# Patient Record
Sex: FEMALE | Race: WHITE | Employment: OTHER | URBAN - METROPOLITAN AREA
[De-identification: names, ages, dates, MRNs, and addresses within clinical notes are randomized per-mention and may not be internally consistent; named-entity substitution may affect disease eponyms.]

---

## 2017-01-01 ENCOUNTER — HOSPITAL ENCOUNTER (INPATIENT)
Facility: HOSPITAL | Age: 82
LOS: 1 days | Discharge: HOME | DRG: 303 | End: 2017-01-01
Attending: EMERGENCY MEDICINE | Admitting: INTERNAL MEDICINE
Payer: MEDICARE

## 2017-01-01 ENCOUNTER — HOSPITAL ENCOUNTER (INPATIENT)
Facility: HOSPITAL | Age: 82
LOS: 1 days | DRG: 871 | End: 2017-01-01
Attending: EMERGENCY MEDICINE | Admitting: INTERNAL MEDICINE
Payer: MEDICARE

## 2017-01-01 ENCOUNTER — HOSPITAL ENCOUNTER (EMERGENCY)
Facility: HOSPITAL | Age: 82
Discharge: HOME | End: 2017-01-01
Attending: EMERGENCY MEDICINE
Payer: MEDICARE

## 2017-01-01 ENCOUNTER — HOSPITAL ENCOUNTER (OUTPATIENT)
Dept: RESPIRATORY THERAPY | Facility: HOSPITAL | Age: 82
Discharge: HOME | End: 2017-01-01
Attending: INTERNAL MEDICINE
Payer: MEDICARE

## 2017-01-01 ENCOUNTER — APPOINTMENT (OUTPATIENT)
Dept: CT IMAGING | Facility: HOSPITAL | Age: 82
DRG: 303 | End: 2017-01-01
Attending: INTERNAL MEDICINE
Payer: MEDICARE

## 2017-01-01 ENCOUNTER — APPOINTMENT (OUTPATIENT)
Dept: CV DIAGNOSTICS | Facility: HOSPITAL | Age: 82
DRG: 303 | End: 2017-01-01
Attending: INTERNAL MEDICINE
Payer: MEDICARE

## 2017-01-01 ENCOUNTER — APPOINTMENT (OUTPATIENT)
Dept: GENERAL RADIOLOGY | Facility: HOSPITAL | Age: 82
DRG: 303 | End: 2017-01-01
Attending: EMERGENCY MEDICINE
Payer: MEDICARE

## 2017-01-01 ENCOUNTER — APPOINTMENT (OUTPATIENT)
Dept: GENERAL RADIOLOGY | Facility: HOSPITAL | Age: 82
DRG: 871 | End: 2017-01-01
Attending: EMERGENCY MEDICINE
Payer: MEDICARE

## 2017-01-01 ENCOUNTER — APPOINTMENT (OUTPATIENT)
Dept: INTERVENTIONAL RADIOLOGY/VASCULAR | Facility: HOSPITAL | Age: 82
DRG: 871 | End: 2017-01-01
Attending: INTERNAL MEDICINE
Payer: MEDICARE

## 2017-01-01 VITALS
DIASTOLIC BLOOD PRESSURE: 44 MMHG | BODY MASS INDEX: 21 KG/M2 | HEART RATE: 136 BPM | WEIGHT: 108 LBS | OXYGEN SATURATION: 95 % | RESPIRATION RATE: 35 BRPM | TEMPERATURE: 99 F | SYSTOLIC BLOOD PRESSURE: 85 MMHG

## 2017-01-01 VITALS
HEIGHT: 60 IN | RESPIRATION RATE: 20 BRPM | HEART RATE: 93 BPM | BODY MASS INDEX: 21.14 KG/M2 | SYSTOLIC BLOOD PRESSURE: 115 MMHG | WEIGHT: 107.69 LBS | OXYGEN SATURATION: 93 % | DIASTOLIC BLOOD PRESSURE: 61 MMHG | TEMPERATURE: 98 F

## 2017-01-01 VITALS
SYSTOLIC BLOOD PRESSURE: 125 MMHG | OXYGEN SATURATION: 100 % | HEIGHT: 60 IN | DIASTOLIC BLOOD PRESSURE: 61 MMHG | HEART RATE: 85 BPM | BODY MASS INDEX: 21.29 KG/M2 | WEIGHT: 108.44 LBS | TEMPERATURE: 98 F | RESPIRATION RATE: 20 BRPM

## 2017-01-01 VITALS
WEIGHT: 110 LBS | RESPIRATION RATE: 19 BRPM | OXYGEN SATURATION: 100 % | TEMPERATURE: 98 F | BODY MASS INDEX: 21.6 KG/M2 | DIASTOLIC BLOOD PRESSURE: 48 MMHG | HEIGHT: 60 IN | SYSTOLIC BLOOD PRESSURE: 114 MMHG | HEART RATE: 78 BPM

## 2017-01-01 DIAGNOSIS — I21.4 NSTEMI (NON-ST ELEVATED MYOCARDIAL INFARCTION) (HCC): ICD-10-CM

## 2017-01-01 DIAGNOSIS — N30.01 ACUTE CYSTITIS WITH HEMATURIA: ICD-10-CM

## 2017-01-01 DIAGNOSIS — R65.21 SEPTIC SHOCK (HCC): Primary | ICD-10-CM

## 2017-01-01 DIAGNOSIS — N17.9 AKI (ACUTE KIDNEY INJURY) (HCC): ICD-10-CM

## 2017-01-01 DIAGNOSIS — J44.9 COPD (CHRONIC OBSTRUCTIVE PULMONARY DISEASE) (HCC): ICD-10-CM

## 2017-01-01 DIAGNOSIS — R77.8 ELEVATED TROPONIN: Primary | ICD-10-CM

## 2017-01-01 DIAGNOSIS — R53.1 WEAKNESS GENERALIZED: ICD-10-CM

## 2017-01-01 DIAGNOSIS — R07.9 ACUTE CHEST PAIN: Primary | ICD-10-CM

## 2017-01-01 DIAGNOSIS — A41.9 SEPTIC SHOCK (HCC): Primary | ICD-10-CM

## 2017-01-01 DIAGNOSIS — R77.8 ELEVATED TROPONIN: ICD-10-CM

## 2017-01-01 DIAGNOSIS — J44.9 CHRONIC OBSTRUCTIVE PULMONARY DISEASE, UNSPECIFIED COPD TYPE (HCC): ICD-10-CM

## 2017-01-01 LAB
ALBUMIN SERPL BCP-MCNC: 3.7 G/DL (ref 3.5–4.8)
ALP SERPL-CCNC: 73 U/L (ref 32–100)
ALT SERPL-CCNC: 22 U/L (ref 14–54)
ANION GAP SERPL CALC-SCNC: 14 MMOL/L (ref 0–18)
ANION GAP SERPL CALC-SCNC: 7 MMOL/L (ref 0–18)
ANION GAP SERPL CALC-SCNC: 8 MMOL/L (ref 0–18)
ANTIBODY SCREEN: NEGATIVE
APTT PPP: 139 SECONDS (ref 23.2–35.3)
APTT PPP: 30.1 SECONDS (ref 23.2–35.3)
APTT PPP: 36.5 SECONDS (ref 23.2–35.3)
APTT PPP: 48.5 SECONDS (ref 23.2–35.3)
AST SERPL-CCNC: 32 U/L (ref 15–41)
BASOPHILS # BLD: 0 K/UL (ref 0–0.2)
BASOPHILS # BLD: 0.2 K/UL (ref 0–0.2)
BASOPHILS NFR BLD: 1 %
BASOPHILS NFR BLD: 1 %
BILIRUB DIRECT SERPL-MCNC: 0.1 MG/DL (ref 0–0.2)
BILIRUB SERPL-MCNC: 0.7 MG/DL (ref 0.3–1.2)
BILIRUB UR QL: NEGATIVE
BILIRUB UR QL: NEGATIVE
BNP SERPL-MCNC: 1128 PG/ML (ref 0–100)
BNP SERPL-MCNC: 309 PG/ML (ref 0–100)
BUN SERPL-MCNC: 15 MG/DL (ref 8–20)
BUN SERPL-MCNC: 17 MG/DL (ref 8–20)
BUN SERPL-MCNC: 28 MG/DL (ref 8–20)
BUN/CREAT SERPL: 12.6 (ref 10–20)
BUN/CREAT SERPL: 15 (ref 10–20)
BUN/CREAT SERPL: 16.7 (ref 10–20)
CALCIUM SERPL-MCNC: 8.9 MG/DL (ref 8.5–10.5)
CALCIUM SERPL-MCNC: 9.2 MG/DL (ref 8.5–10.5)
CALCIUM SERPL-MCNC: 9.2 MG/DL (ref 8.5–10.5)
CHLORIDE SERPL-SCNC: 101 MMOL/L (ref 95–110)
CHLORIDE SERPL-SCNC: 101 MMOL/L (ref 95–110)
CHLORIDE SERPL-SCNC: 103 MMOL/L (ref 95–110)
CHOLEST SERPL-MCNC: 194 MG/DL (ref 110–200)
CHOLEST SERPL-MCNC: 197 MG/DL (ref 110–200)
CLARITY UR: CLEAR
CO2 SERPL-SCNC: 23 MMOL/L (ref 22–32)
CO2 SERPL-SCNC: 26 MMOL/L (ref 22–32)
CO2 SERPL-SCNC: 27 MMOL/L (ref 22–32)
COLOR UR: YELLOW
COLOR UR: YELLOW
CREAT SERPL-MCNC: 1.13 MG/DL (ref 0.5–1.5)
CREAT SERPL-MCNC: 1.19 MG/DL (ref 0.5–1.5)
CREAT SERPL-MCNC: 1.68 MG/DL (ref 0.5–1.5)
EOSINOPHIL # BLD: 0 K/UL (ref 0–0.7)
EOSINOPHIL # BLD: 0 K/UL (ref 0–0.7)
EOSINOPHIL NFR BLD: 0 %
EOSINOPHIL NFR BLD: 1 %
ERYTHROCYTE [DISTWIDTH] IN BLOOD BY AUTOMATED COUNT: 13.9 % (ref 11–15)
ERYTHROCYTE [DISTWIDTH] IN BLOOD BY AUTOMATED COUNT: 14.4 % (ref 11–15)
GLUCOSE BLDC GLUCOMTR-MCNC: 124 MG/DL (ref 70–99)
GLUCOSE SERPL-MCNC: 109 MG/DL (ref 70–99)
GLUCOSE SERPL-MCNC: 112 MG/DL (ref 70–99)
GLUCOSE SERPL-MCNC: 142 MG/DL (ref 70–99)
GLUCOSE UR-MCNC: NEGATIVE MG/DL
GLUCOSE UR-MCNC: NEGATIVE MG/DL
HCT VFR BLD AUTO: 34.4 % (ref 35–48)
HCT VFR BLD AUTO: 36.2 % (ref 35–48)
HDLC SERPL-MCNC: 100 MG/DL
HDLC SERPL-MCNC: 93 MG/DL
HGB BLD-MCNC: 11.5 G/DL (ref 12–16)
HGB BLD-MCNC: 12.4 G/DL (ref 12–16)
HGB UR QL STRIP.AUTO: NEGATIVE
INR BLD: 1 (ref 0.9–1.2)
KETONES UR-MCNC: NEGATIVE MG/DL
KETONES UR-MCNC: NEGATIVE MG/DL
LACTATE SERPL-SCNC: 1.3 MMOL/L (ref 0.5–2.2)
LDLC SERPL CALC-MCNC: 75 MG/DL (ref 0–99)
LDLC SERPL CALC-MCNC: 84 MG/DL (ref 0–99)
LEUKOCYTE ESTERASE UR QL STRIP.AUTO: NEGATIVE
LYMPHOCYTES # BLD: 0.4 K/UL (ref 1–4)
LYMPHOCYTES # BLD: 0.8 K/UL (ref 1–4)
LYMPHOCYTES NFR BLD: 10 %
LYMPHOCYTES NFR BLD: 2 %
MCH RBC QN AUTO: 31.2 PG (ref 27–32)
MCH RBC QN AUTO: 31.2 PG (ref 27–32)
MCHC RBC AUTO-ENTMCNC: 33.4 G/DL (ref 32–37)
MCHC RBC AUTO-ENTMCNC: 34.2 G/DL (ref 32–37)
MCV RBC AUTO: 91.2 FL (ref 80–100)
MCV RBC AUTO: 93.6 FL (ref 80–100)
MONOCYTES # BLD: 0.7 K/UL (ref 0–1)
MONOCYTES # BLD: 0.7 K/UL (ref 0–1)
MONOCYTES NFR BLD: 3 %
MONOCYTES NFR BLD: 8 %
MRSA DNA SPEC QL NAA+PROBE: NEGATIVE
NEUTROPHILS # BLD AUTO: 19 K/UL (ref 1.8–7.7)
NEUTROPHILS # BLD AUTO: 7 K/UL (ref 1.8–7.7)
NEUTROPHILS NFR BLD: 82 %
NEUTROPHILS NFR BLD: 94 %
NITRITE UR QL STRIP.AUTO: NEGATIVE
NITRITE UR QL STRIP.AUTO: NEGATIVE
NONHDLC SERPL-MCNC: 104 MG/DL
NONHDLC SERPL-MCNC: 94 MG/DL
OSMOLALITY UR CALC.SUM OF ELEC: 283 MOSM/KG (ref 275–295)
OSMOLALITY UR CALC.SUM OF ELEC: 286 MOSM/KG (ref 275–295)
OSMOLALITY UR CALC.SUM OF ELEC: 292 MOSM/KG (ref 275–295)
PH UR: 5 [PH] (ref 5–8)
PH UR: 5 [PH] (ref 5–8)
PLATELET # BLD AUTO: 291 K/UL (ref 140–400)
PLATELET # BLD AUTO: 362 K/UL (ref 140–400)
PMV BLD AUTO: 7.4 FL (ref 7.4–10.3)
PMV BLD AUTO: 7.6 FL (ref 7.4–10.3)
POTASSIUM SERPL-SCNC: 3.7 MMOL/L (ref 3.3–5.1)
POTASSIUM SERPL-SCNC: 3.8 MMOL/L (ref 3.3–5.1)
PROT SERPL-MCNC: 6.7 G/DL (ref 5.9–8.4)
PROT UR-MCNC: 30 MG/DL
PROT UR-MCNC: NEGATIVE MG/DL
PROTHROMBIN TIME: 13.2 SECONDS (ref 11.8–14.5)
RBC # BLD AUTO: 3.68 M/UL (ref 3.7–5.4)
RBC # BLD AUTO: 3.97 M/UL (ref 3.7–5.4)
RBC #/AREA URNS AUTO: 3 /HPF
RH BLOOD TYPE: POSITIVE
SODIUM SERPL-SCNC: 136 MMOL/L (ref 136–144)
SODIUM SERPL-SCNC: 136 MMOL/L (ref 136–144)
SODIUM SERPL-SCNC: 138 MMOL/L (ref 136–144)
SP GR UR STRIP: 1.01 (ref 1–1.03)
SP GR UR STRIP: 1.01 (ref 1–1.03)
T3 SERPL-MCNC: 0.38 NG/ML (ref 0.87–1.78)
T4 FREE SERPL-MCNC: 1.11 NG/DL (ref 0.58–1.64)
TRIGL SERPL-MCNC: 97 MG/DL (ref 1–149)
TRIGL SERPL-MCNC: 99 MG/DL (ref 1–149)
TROPONIN I SERPL-MCNC: 0.21 NG/ML (ref ?–0.03)
TROPONIN I SERPL-MCNC: 0.23 NG/ML (ref ?–0.03)
TROPONIN I SERPL-MCNC: 2.59 NG/ML (ref ?–0.03)
TROPONIN I SERPL-MCNC: 9.42 NG/ML (ref ?–0.03)
TSH SERPL-ACNC: 0.33 UIU/ML (ref 0.45–5.33)
UROBILINOGEN UR STRIP-ACNC: <2
UROBILINOGEN UR STRIP-ACNC: <2
VIT C UR-MCNC: NEGATIVE MG/DL
VIT C UR-MCNC: NEGATIVE MG/DL
WBC # BLD AUTO: 20.3 K/UL (ref 4–11)
WBC # BLD AUTO: 8.6 K/UL (ref 4–11)
WBC #/AREA URNS AUTO: 15 /HPF

## 2017-01-01 PROCEDURE — 83880 ASSAY OF NATRIURETIC PEPTIDE: CPT | Performed by: EMERGENCY MEDICINE

## 2017-01-01 PROCEDURE — 84443 ASSAY THYROID STIM HORMONE: CPT | Performed by: INTERNAL MEDICINE

## 2017-01-01 PROCEDURE — 87086 URINE CULTURE/COLONY COUNT: CPT | Performed by: EMERGENCY MEDICINE

## 2017-01-01 PROCEDURE — 51701 INSERT BLADDER CATHETER: CPT

## 2017-01-01 PROCEDURE — 80048 BASIC METABOLIC PNL TOTAL CA: CPT | Performed by: INTERNAL MEDICINE

## 2017-01-01 PROCEDURE — 87186 SC STD MICRODIL/AGAR DIL: CPT

## 2017-01-01 PROCEDURE — 36415 COLL VENOUS BLD VENIPUNCTURE: CPT

## 2017-01-01 PROCEDURE — 3E0F73Z INTRODUCTION OF ANTI-INFLAMMATORY INTO RESPIRATORY TRACT, VIA NATURAL OR ARTIFICIAL OPENING: ICD-10-PCS | Performed by: INTERNAL MEDICINE

## 2017-01-01 PROCEDURE — 87186 SC STD MICRODIL/AGAR DIL: CPT | Performed by: EMERGENCY MEDICINE

## 2017-01-01 PROCEDURE — 82962 GLUCOSE BLOOD TEST: CPT

## 2017-01-01 PROCEDURE — 84439 ASSAY OF FREE THYROXINE: CPT | Performed by: INTERNAL MEDICINE

## 2017-01-01 PROCEDURE — 86901 BLOOD TYPING SEROLOGIC RH(D): CPT | Performed by: EMERGENCY MEDICINE

## 2017-01-01 PROCEDURE — 93005 ELECTROCARDIOGRAM TRACING: CPT

## 2017-01-01 PROCEDURE — 99285 EMERGENCY DEPT VISIT HI MDM: CPT

## 2017-01-01 PROCEDURE — 85025 COMPLETE CBC W/AUTO DIFF WBC: CPT | Performed by: EMERGENCY MEDICINE

## 2017-01-01 PROCEDURE — 80048 BASIC METABOLIC PNL TOTAL CA: CPT | Performed by: EMERGENCY MEDICINE

## 2017-01-01 PROCEDURE — 93010 ELECTROCARDIOGRAM REPORT: CPT | Performed by: EMERGENCY MEDICINE

## 2017-01-01 PROCEDURE — 84132 ASSAY OF SERUM POTASSIUM: CPT | Performed by: INTERNAL MEDICINE

## 2017-01-01 PROCEDURE — 84484 ASSAY OF TROPONIN QUANT: CPT | Performed by: INTERNAL MEDICINE

## 2017-01-01 PROCEDURE — 80053 COMPREHEN METABOLIC PANEL: CPT | Performed by: INTERNAL MEDICINE

## 2017-01-01 PROCEDURE — 96365 THER/PROPH/DIAG IV INF INIT: CPT

## 2017-01-01 PROCEDURE — 84484 ASSAY OF TROPONIN QUANT: CPT | Performed by: EMERGENCY MEDICINE

## 2017-01-01 PROCEDURE — 36556 INSERT NON-TUNNEL CV CATH: CPT

## 2017-01-01 PROCEDURE — 85610 PROTHROMBIN TIME: CPT | Performed by: EMERGENCY MEDICINE

## 2017-01-01 PROCEDURE — 86900 BLOOD TYPING SEROLOGIC ABO: CPT | Performed by: EMERGENCY MEDICINE

## 2017-01-01 PROCEDURE — 71010 XR CHEST AP PORTABLE  (CPT=71010): CPT | Performed by: EMERGENCY MEDICINE

## 2017-01-01 PROCEDURE — 81003 URINALYSIS AUTO W/O SCOPE: CPT | Performed by: EMERGENCY MEDICINE

## 2017-01-01 PROCEDURE — 86850 RBC ANTIBODY SCREEN: CPT | Performed by: EMERGENCY MEDICINE

## 2017-01-01 PROCEDURE — 85730 THROMBOPLASTIN TIME PARTIAL: CPT | Performed by: INTERNAL MEDICINE

## 2017-01-01 PROCEDURE — 99284 EMERGENCY DEPT VISIT MOD MDM: CPT

## 2017-01-01 PROCEDURE — 80061 LIPID PANEL: CPT | Performed by: EMERGENCY MEDICINE

## 2017-01-01 PROCEDURE — 83605 ASSAY OF LACTIC ACID: CPT | Performed by: EMERGENCY MEDICINE

## 2017-01-01 PROCEDURE — 81001 URINALYSIS AUTO W/SCOPE: CPT | Performed by: EMERGENCY MEDICINE

## 2017-01-01 PROCEDURE — 85025 COMPLETE CBC W/AUTO DIFF WBC: CPT | Performed by: INTERNAL MEDICINE

## 2017-01-01 PROCEDURE — 93306 TTE W/DOPPLER COMPLETE: CPT | Performed by: INTERNAL MEDICINE

## 2017-01-01 PROCEDURE — 96375 TX/PRO/DX INJ NEW DRUG ADDON: CPT

## 2017-01-01 PROCEDURE — 94640 AIRWAY INHALATION TREATMENT: CPT

## 2017-01-01 PROCEDURE — 93010 ELECTROCARDIOGRAM REPORT: CPT | Performed by: INTERNAL MEDICINE

## 2017-01-01 PROCEDURE — 85730 THROMBOPLASTIN TIME PARTIAL: CPT | Performed by: EMERGENCY MEDICINE

## 2017-01-01 PROCEDURE — 51702 INSERT TEMP BLADDER CATH: CPT

## 2017-01-01 PROCEDURE — 36415 COLL VENOUS BLD VENIPUNCTURE: CPT | Performed by: INTERNAL MEDICINE

## 2017-01-01 PROCEDURE — 80061 LIPID PANEL: CPT | Performed by: INTERNAL MEDICINE

## 2017-01-01 PROCEDURE — 84480 ASSAY TRIIODOTHYRONINE (T3): CPT | Performed by: INTERNAL MEDICINE

## 2017-01-01 PROCEDURE — 71250 CT THORAX DX C-: CPT | Performed by: INTERNAL MEDICINE

## 2017-01-01 PROCEDURE — 87641 MR-STAPH DNA AMP PROBE: CPT | Performed by: EMERGENCY MEDICINE

## 2017-01-01 PROCEDURE — 87077 CULTURE AEROBIC IDENTIFY: CPT

## 2017-01-01 PROCEDURE — 87040 BLOOD CULTURE FOR BACTERIA: CPT

## 2017-01-01 PROCEDURE — 87077 CULTURE AEROBIC IDENTIFY: CPT | Performed by: EMERGENCY MEDICINE

## 2017-01-01 PROCEDURE — 80076 HEPATIC FUNCTION PANEL: CPT | Performed by: EMERGENCY MEDICINE

## 2017-01-01 PROCEDURE — 05HM33Z INSERTION OF INFUSION DEVICE INTO RIGHT INTERNAL JUGULAR VEIN, PERCUTANEOUS APPROACH: ICD-10-PCS | Performed by: INTERNAL MEDICINE

## 2017-01-01 PROCEDURE — 83735 ASSAY OF MAGNESIUM: CPT | Performed by: EMERGENCY MEDICINE

## 2017-01-01 RX ORDER — PREDNISONE 20 MG/1
20 TABLET ORAL
Status: DISCONTINUED | OUTPATIENT
Start: 2017-01-01 | End: 2017-01-01

## 2017-01-01 RX ORDER — ISOSORBIDE MONONITRATE 30 MG/1
30 TABLET, EXTENDED RELEASE ORAL 2 TIMES DAILY
Qty: 60 TABLET | Refills: 0 | Status: SHIPPED | OUTPATIENT
Start: 2017-01-01

## 2017-01-01 RX ORDER — ALBUTEROL SULFATE 2.5 MG/3ML
SOLUTION RESPIRATORY (INHALATION) EVERY 4 HOURS PRN
COMMUNITY

## 2017-01-01 RX ORDER — VERAPAMIL HYDROCHLORIDE 40 MG/1
120 TABLET ORAL DAILY
Status: DISCONTINUED | OUTPATIENT
Start: 2017-01-01 | End: 2017-01-01

## 2017-01-01 RX ORDER — ONDANSETRON 2 MG/ML
4 INJECTION INTRAMUSCULAR; INTRAVENOUS EVERY 6 HOURS PRN
Status: DISCONTINUED | OUTPATIENT
Start: 2017-01-01 | End: 2017-01-01

## 2017-01-01 RX ORDER — LIDOCAINE HYDROCHLORIDE 20 MG/ML
INJECTION, SOLUTION EPIDURAL; INFILTRATION; INTRACAUDAL; PERINEURAL
Status: COMPLETED
Start: 2017-01-01 | End: 2017-01-01

## 2017-01-01 RX ORDER — CLOPIDOGREL BISULFATE 75 MG/1
300 TABLET ORAL ONCE
Status: DISCONTINUED | OUTPATIENT
Start: 2017-01-01 | End: 2017-01-01

## 2017-01-01 RX ORDER — SENNOSIDES 8.6 MG
8.6 TABLET ORAL 3 TIMES DAILY PRN
COMMUNITY

## 2017-01-01 RX ORDER — ASPIRIN 81 MG/1
81 TABLET, CHEWABLE ORAL DAILY
Status: DISCONTINUED | OUTPATIENT
Start: 2017-01-01 | End: 2017-07-30

## 2017-01-01 RX ORDER — ISOSORBIDE MONONITRATE 30 MG/1
30 TABLET, EXTENDED RELEASE ORAL 2 TIMES DAILY
Status: DISCONTINUED | OUTPATIENT
Start: 2017-01-01 | End: 2017-01-01

## 2017-01-01 RX ORDER — SODIUM CHLORIDE 0.9 % (FLUSH) 0.9 %
3 SYRINGE (ML) INJECTION AS NEEDED
Status: DISCONTINUED | OUTPATIENT
Start: 2017-01-01 | End: 2017-01-01

## 2017-01-01 RX ORDER — DIPHENHYDRAMINE HCL 25 MG
25 CAPSULE ORAL EVERY 6 HOURS PRN
Status: DISCONTINUED | OUTPATIENT
Start: 2017-01-01 | End: 2017-01-01

## 2017-01-01 RX ORDER — DOXEPIN HYDROCHLORIDE 50 MG/1
1 CAPSULE ORAL DAILY
Status: DISCONTINUED | OUTPATIENT
Start: 2017-01-01 | End: 2017-01-01

## 2017-01-01 RX ORDER — ACETAMINOPHEN 650 MG/1
650 SUPPOSITORY RECTAL ONCE
Status: COMPLETED | OUTPATIENT
Start: 2017-01-01 | End: 2017-01-01

## 2017-01-01 RX ORDER — POTASSIUM CHLORIDE 20 MEQ/1
40 TABLET, EXTENDED RELEASE ORAL ONCE
Status: COMPLETED | OUTPATIENT
Start: 2017-01-01 | End: 2017-01-01

## 2017-01-01 RX ORDER — PREDNISONE 1 MG/1
5 TABLET ORAL DAILY
Status: DISCONTINUED | OUTPATIENT
Start: 2017-01-01 | End: 2017-01-01

## 2017-01-01 RX ORDER — ASPIRIN 81 MG/1
81 TABLET ORAL DAILY
Status: DISCONTINUED | OUTPATIENT
Start: 2017-01-01 | End: 2017-01-01

## 2017-01-01 RX ORDER — ACETAMINOPHEN 325 MG/1
650 TABLET ORAL EVERY 6 HOURS PRN
Status: DISCONTINUED | OUTPATIENT
Start: 2017-01-01 | End: 2017-07-30

## 2017-01-01 RX ORDER — ACETAMINOPHEN 160 MG
2000 TABLET,DISINTEGRATING ORAL DAILY
COMMUNITY

## 2017-01-01 RX ORDER — FAMOTIDINE 20 MG/1
20 TABLET ORAL 2 TIMES DAILY
Status: DISCONTINUED | OUTPATIENT
Start: 2017-01-01 | End: 2017-01-01

## 2017-01-01 RX ORDER — HEPARIN SODIUM AND DEXTROSE 10000; 5 [USP'U]/100ML; G/100ML
INJECTION INTRAVENOUS CONTINUOUS
Status: DISCONTINUED | OUTPATIENT
Start: 2017-01-01 | End: 2017-01-01

## 2017-01-01 RX ORDER — CLOPIDOGREL BISULFATE 75 MG/1
75 TABLET ORAL DAILY
Status: DISCONTINUED | OUTPATIENT
Start: 2017-01-01 | End: 2017-07-30

## 2017-01-01 RX ORDER — FUROSEMIDE 20 MG/1
20 TABLET ORAL DAILY
Status: DISCONTINUED | OUTPATIENT
Start: 2017-01-01 | End: 2017-01-01

## 2017-01-01 RX ORDER — ASPIRIN 81 MG/1
81 TABLET, CHEWABLE ORAL DAILY
Status: DISCONTINUED | OUTPATIENT
Start: 2017-01-01 | End: 2017-01-01

## 2017-01-01 RX ORDER — ASPIRIN 325 MG
325 TABLET ORAL ONCE
Status: COMPLETED | OUTPATIENT
Start: 2017-01-01 | End: 2017-01-01

## 2017-01-01 RX ORDER — PRAVASTATIN SODIUM 40 MG
40 TABLET ORAL NIGHTLY
COMMUNITY

## 2017-01-01 RX ORDER — ALPRAZOLAM 0.25 MG/1
0.25 TABLET ORAL NIGHTLY PRN
Status: DISCONTINUED | OUTPATIENT
Start: 2017-01-01 | End: 2017-01-01

## 2017-01-01 RX ORDER — ACETAMINOPHEN AND CODEINE PHOSPHATE 300; 30 MG/1; MG/1
1 TABLET ORAL EVERY 6 HOURS PRN
Status: DISCONTINUED | OUTPATIENT
Start: 2017-01-01 | End: 2017-01-01

## 2017-01-01 RX ORDER — ENOXAPARIN SODIUM 100 MG/ML
40 INJECTION SUBCUTANEOUS DAILY
Status: DISCONTINUED | OUTPATIENT
Start: 2017-01-01 | End: 2017-01-01

## 2017-01-01 RX ORDER — ACYCLOVIR 400 MG/1
800 TABLET ORAL EVERY 8 HOURS
Status: DISCONTINUED | OUTPATIENT
Start: 2017-01-01 | End: 2017-01-01

## 2017-01-01 RX ORDER — LISINOPRIL 5 MG/1
5 TABLET ORAL DAILY
COMMUNITY

## 2017-01-01 RX ORDER — LORATADINE 10 MG/1
10 TABLET ORAL DAILY
COMMUNITY

## 2017-01-01 RX ORDER — MIDAZOLAM HYDROCHLORIDE 1 MG/ML
INJECTION INTRAMUSCULAR; INTRAVENOUS
Status: COMPLETED
Start: 2017-01-01 | End: 2017-01-01

## 2017-01-01 RX ORDER — PREDNISONE 1 MG/1
TABLET ORAL
Qty: 100 TABLET | Refills: 5 | Status: SHIPPED | OUTPATIENT
Start: 2017-01-01

## 2017-01-01 RX ORDER — FAMOTIDINE 20 MG/1
20 TABLET ORAL 2 TIMES DAILY
Status: DISCONTINUED | OUTPATIENT
Start: 2017-01-01 | End: 2017-07-30

## 2017-01-01 RX ORDER — SODIUM CHLORIDE 9 MG/ML
INJECTION, SOLUTION INTRAVENOUS CONTINUOUS
Status: DISCONTINUED | OUTPATIENT
Start: 2017-01-01 | End: 2017-07-30

## 2017-01-01 RX ORDER — FLUTICASONE PROPIONATE 50 MCG
1 SPRAY, SUSPENSION (ML) NASAL DAILY
COMMUNITY

## 2017-01-01 RX ORDER — ALBUTEROL SULFATE 2.5 MG/3ML
2.5 SOLUTION RESPIRATORY (INHALATION) EVERY 4 HOURS PRN
Status: DISCONTINUED | OUTPATIENT
Start: 2017-01-01 | End: 2017-07-30

## 2017-01-01 RX ORDER — 0.9 % SODIUM CHLORIDE 0.9 %
VIAL (ML) INJECTION
Status: COMPLETED
Start: 2017-01-01 | End: 2017-01-01

## 2017-01-01 RX ORDER — ASPIRIN 81 MG/1
324 TABLET, CHEWABLE ORAL ONCE
Status: COMPLETED | OUTPATIENT
Start: 2017-01-01 | End: 2017-01-01

## 2017-01-01 RX ORDER — ASPIRIN 300 MG
SUPPOSITORY, RECTAL RECTAL
Status: COMPLETED
Start: 2017-01-01 | End: 2017-01-01

## 2017-01-01 RX ORDER — SENNOSIDES 8.6 MG
8.6 TABLET ORAL 3 TIMES DAILY PRN
Status: DISCONTINUED | OUTPATIENT
Start: 2017-01-01 | End: 2017-01-01

## 2017-01-01 RX ORDER — NITROGLYCERIN 0.4 MG/1
0.4 TABLET SUBLINGUAL EVERY 5 MIN PRN
Qty: 30 TABLET | Refills: 0 | Status: SHIPPED | OUTPATIENT
Start: 2017-01-01

## 2017-01-01 RX ORDER — LORAZEPAM 0.5 MG/1
0.5 TABLET ORAL DAILY PRN
Status: DISCONTINUED | OUTPATIENT
Start: 2017-01-01 | End: 2017-01-01

## 2017-01-01 RX ORDER — OYSTER SHELL CALCIUM WITH VITAMIN D 500; 200 MG/1; [IU]/1
1 TABLET, FILM COATED ORAL DAILY
Status: DISCONTINUED | OUTPATIENT
Start: 2017-01-01 | End: 2017-01-01

## 2017-01-01 RX ORDER — ALPRAZOLAM 0.25 MG/1
0.25 TABLET ORAL 2 TIMES DAILY PRN
Status: DISCONTINUED | OUTPATIENT
Start: 2017-01-01 | End: 2017-07-30

## 2017-01-01 RX ORDER — MELATONIN
325
Status: DISCONTINUED | OUTPATIENT
Start: 2017-01-01 | End: 2017-01-01

## 2017-01-01 RX ORDER — POTASSIUM CHLORIDE 14.9 MG/ML
20 INJECTION INTRAVENOUS ONCE
Status: COMPLETED | OUTPATIENT
Start: 2017-01-01 | End: 2017-01-01

## 2017-01-01 RX ORDER — POLYETHYLENE GLYCOL 3350 17 G/17G
17 POWDER, FOR SOLUTION ORAL DAILY
Status: DISCONTINUED | OUTPATIENT
Start: 2017-01-01 | End: 2017-01-01

## 2017-01-01 RX ORDER — POTASSIUM CHLORIDE 20 MEQ/1
40 TABLET, EXTENDED RELEASE ORAL EVERY 4 HOURS
Status: DISCONTINUED | OUTPATIENT
Start: 2017-01-01 | End: 2017-01-01

## 2017-01-01 RX ORDER — ENOXAPARIN SODIUM 100 MG/ML
30 INJECTION SUBCUTANEOUS DAILY
Status: DISCONTINUED | OUTPATIENT
Start: 2017-01-01 | End: 2017-07-30

## 2017-01-01 RX ORDER — METOPROLOL TARTRATE 5 MG/5ML
2.5 INJECTION INTRAVENOUS ONCE
Status: COMPLETED | OUTPATIENT
Start: 2017-01-01 | End: 2017-01-01

## 2017-01-01 RX ORDER — LORAZEPAM 0.5 MG/1
0.5 TABLET ORAL DAILY PRN
COMMUNITY

## 2017-01-01 RX ORDER — METOPROLOL TARTRATE 5 MG/5ML
INJECTION INTRAVENOUS
Status: COMPLETED
Start: 2017-01-01 | End: 2017-01-01

## 2017-01-01 RX ORDER — CETIRIZINE HYDROCHLORIDE 10 MG/1
10 TABLET ORAL DAILY
Status: DISCONTINUED | OUTPATIENT
Start: 2017-01-01 | End: 2017-01-01

## 2017-01-01 RX ORDER — POTASSIUM CHLORIDE 750 MG/1
20 TABLET, EXTENDED RELEASE ORAL DAILY
Status: DISCONTINUED | OUTPATIENT
Start: 2017-01-01 | End: 2017-01-01

## 2017-01-01 RX ORDER — ONDANSETRON 2 MG/ML
4 INJECTION INTRAMUSCULAR; INTRAVENOUS EVERY 6 HOURS PRN
Status: DISCONTINUED | OUTPATIENT
Start: 2017-01-01 | End: 2017-07-30

## 2017-01-01 RX ORDER — SODIUM CHLORIDE 9 MG/ML
INJECTION, SOLUTION INTRAVENOUS
Status: COMPLETED
Start: 2017-01-01 | End: 2017-01-01

## 2017-01-01 RX ORDER — HEPARIN SODIUM AND DEXTROSE 10000; 5 [USP'U]/100ML; G/100ML
12 INJECTION INTRAVENOUS ONCE
Status: COMPLETED | OUTPATIENT
Start: 2017-01-01 | End: 2017-01-01

## 2017-01-01 RX ORDER — MELATONIN
325
Status: DISCONTINUED | OUTPATIENT
Start: 2017-07-30 | End: 2017-07-30

## 2017-01-01 RX ORDER — ONDANSETRON 2 MG/ML
INJECTION INTRAMUSCULAR; INTRAVENOUS
Status: COMPLETED
Start: 2017-01-01 | End: 2017-01-01

## 2017-01-01 RX ORDER — SODIUM CHLORIDE 0.9 % (FLUSH) 0.9 %
3 SYRINGE (ML) INJECTION AS NEEDED
Status: DISCONTINUED | OUTPATIENT
Start: 2017-01-01 | End: 2017-07-30

## 2017-01-01 RX ORDER — CLOPIDOGREL BISULFATE 75 MG/1
75 TABLET ORAL DAILY
Status: DISCONTINUED | OUTPATIENT
Start: 2017-01-01 | End: 2017-01-01

## 2017-01-01 RX ORDER — ACETAMINOPHEN 650 MG/1
SUPPOSITORY RECTAL
Status: COMPLETED
Start: 2017-01-01 | End: 2017-01-01

## 2017-01-01 RX ORDER — FUROSEMIDE 10 MG/ML
20 INJECTION INTRAMUSCULAR; INTRAVENOUS ONCE
Status: DISCONTINUED | OUTPATIENT
Start: 2017-01-01 | End: 2017-07-30

## 2017-01-01 RX ORDER — HEPARIN SODIUM 1000 [USP'U]/ML
60 INJECTION, SOLUTION INTRAVENOUS; SUBCUTANEOUS ONCE
Status: COMPLETED | OUTPATIENT
Start: 2017-01-01 | End: 2017-01-01

## 2017-05-05 PROBLEM — I10 ESSENTIAL HYPERTENSION: Status: ACTIVE | Noted: 2017-01-01

## 2017-06-16 PROBLEM — I42.9 CARDIOMYOPATHY (HCC): Status: ACTIVE | Noted: 2017-01-01

## 2017-06-16 PROBLEM — I50.20 SYSTOLIC DYSFUNCTION WITH HEART FAILURE (HCC): Status: ACTIVE | Noted: 2017-01-01

## 2017-06-16 PROBLEM — R77.8 ELEVATED TROPONIN: Status: ACTIVE | Noted: 2017-01-01

## 2017-06-16 PROBLEM — J44.9 COPD (CHRONIC OBSTRUCTIVE PULMONARY DISEASE) (HCC): Status: ACTIVE | Noted: 2017-01-01

## 2017-06-16 PROBLEM — R91.1 INCIDENTAL LUNG NODULE, GREATER THAN OR EQUAL TO 8MM: Status: ACTIVE | Noted: 2017-01-01

## 2017-06-16 PROBLEM — R07.9 ACUTE CHEST PAIN: Status: ACTIVE | Noted: 2017-01-01

## 2017-06-16 NOTE — ED NOTES
1st attempt to IV insertion to Right FA unsuccessful. Moderate bruise to Right Forearm noted after attempt. Pt on plavix and pressure dressing applied. Ice applied.

## 2017-06-16 NOTE — ED PROVIDER NOTES
Patient Seen in: [facility] AND [facility] Emergency Department    History   Patient presents with:  Chest Pain Angina (cardiovascular)    Stated Complaint: chest pain that lasted 15 mins     HPI    79 yo F with PMH HTN, CAD c/b ICM (45% EF), aortic stenosis, HL p times daily. predniSONE 5 MG Oral Tab,  Take 1-2 pills as directed daily   famoTIDine (PEPCID) 20 MG Oral Tab,  Take 20 mg by mouth 2 (two) times daily.    ferrous sulfate 325 (65 FE) MG Oral Tab EC,  Take 325 mg by mouth daily with breakfast.   Potassium Exam     ED Triage Vitals   BP 06/16/17 1251 118/47 mmHg   Pulse 06/16/17 1251 79   Resp 06/16/17 1251 18   Temp 06/16/17 1251 98.4 °F (36.9 °C)   Temp src 06/16/17 1251 Oral   SpO2 06/16/17 1251 97 %   O2 Device 06/16/17 1251 Nasal cannula       Current:B Abnormality         Status                     ---------                               -----------         ------                     CBC W/ DIFFERENTIAL[[id number]]          Abnormal            Final result                 Please view results for these tamara 6/16/2017  CONCLUSION:  1. Mild cardiomegaly. Tortuous atherosclerotic aorta. 2. Emphysematous changes. Right upper lobe 1.9 x 1.0 cm spiculated lesion has developed. Differential diagnosis would include inflammatory versus neoplastic lesion. . 3. Recom

## 2017-06-16 NOTE — ED NOTES
Pt to ER with c/o chest pain this am that lasted about 15 minutes. Pt states she took full dose of ASA this am with the pain. Pt wears home o2 2-3L nasal cannula at home. Cardiac monitor and continuous pulse oximetry on. SR on monitor.  Lungs diminished penny

## 2017-06-16 NOTE — PLAN OF CARE
Problem: CARDIOVASCULAR - ADULT  Goal: Maintains optimal cardiac output and hemodynamic stability  INTERVENTIONS:  - Monitor vital signs, rhythm, and trends  - Monitor for bleeding, hypotension and signs of decreased cardiac output  - Evaluate effectivenes hypoglycemia  - Administer ordered medications to maintain glucose within target range  - Assess barriers to adequate nutritional intake and initiate nutrition consult as needed  - Instruct patient on self management of diabetes   Outcome: Augie Daley

## 2017-06-16 NOTE — ED INITIAL ASSESSMENT (HPI)
Pt reports feeling left sided chest pain that radiated into the arm and neck and jaw. Pt reports that it lasted about 15 mins. Pt took a full dose of ASA and felt immediate relief.  Pt denies pain at this time

## 2017-06-16 NOTE — ED NOTES
Nohemy RN aware patient will need a CT chest done upstairs. RN aware we do not have IMDUR dosage in the ER- pt still needs medication. RN verbalized understanding.

## 2017-06-16 NOTE — H&P
[address] Patient Status:  Emergency    1934 MRN [id number]   Location [address] Attending Christiano Sales MD   Hosp Day # 0 PCP Lili Long MD     Date: frontal/occ lobe CVA     History reviewed. No pertinent past surgical history.   Family History   Problem Relation Age of Onset   • Heart Disorder Mother    • Cancer Paternal Grandmother      breast cancer   • Heart Disorder Sister    • Diabetes Sister    • LUNGS: CTAB, no wrr   CV: +systolic ejection murmur   ABD: Soft, nontender and not distended, no masses, no hernia   EXTREMITIES: +BLE pitting edema   NEURO: Strength intact; no sensory deficits       Cervical Papanicolaou contraindicated    Results: Electronically signed on 06/16/2017 at 15:21 by Glenna Hair MD      Nuclear stress test 2/2015  IMPRESSION:  Abnormal myocardial perfusion study demonstrating a fixed small to   medium size perfusion defect in the inferior wall, consistent with   infar further work up  - ordered CT chest. Does not currently have outpatient pulmonologist      Myah Pierre MD  6/16/2017

## 2017-06-16 NOTE — CONSULTS
[facility] Cardiology  Report of Consultation    Koltonjay jay Kumar Patient Status:  Emergency    1934 MRN [id number]   Location [address] Attending Luma Orta MD   Hosp Day # 0 PCP Oswaldo Greene MD     Date of Admissio Paroxysmal SVT (supraventricular tachycardia) (HCC)    • Osteoporosis    • Hypovitaminosis D    • Anxiety    • Aortic valve stenosis      severe AS per 12/14 echo   • Cardiomyopathy ([address]      LVEF 45% based on 79/69 echo   • Embolic stroke involving middl normal S1, S2 without S3; no significant murmur. CAROTIDS:carotid pulses normal.   ABD AORTA:not enlarged. FEM:femoral pulses intact. PEDAL:pedal pulses intact. EXT:no peripheral edema.       LABORATORY DATA:     Labs reviewed:      Tele:      EKG:

## 2017-06-17 NOTE — PLAN OF CARE
CARDIOVASCULAR - ADULT    • Maintains optimal cardiac output and hemodynamic stability Adequate for Discharge    • Absence of cardiac arrhythmias or at baseline Adequate for Discharge        METABOLIC/FLUID AND ELECTROLYTES - ADULT    • Glucose maintained

## 2017-06-17 NOTE — DISCHARGE SUMMARY
[facility]    Discharge Summary    Imagene Rout Patient Status:  Inpatient    1934 MRN [id number]   Location [facility] 3W/SW Attending Crispin Turcios MD   Hosp Day # 1 PCP Allie Jones MD     Date of Admission: 30 mg daily this was increased to 60 mg daily.   Patient generally back to baseline during the hospitalization she is adamantly requesting to be discharged today she has refused any further cardiac testing or procedures and wishes to return to the care of h Inhalation Aerosol Powder, Breath Activated  Inhale 1 puff into the lungs 2 (two) times daily. Tiotropium Bromide Monohydrate (SPIRIVA HANDIHALER) 18 MCG Inhalation Cap  Inhale into the lungs daily.               Discharge Diet: Cardiac diet no added sal

## 2017-06-17 NOTE — PROGRESS NOTES
[address] [place] [address] Cardiology Progress Note        Maria De Jesus Frazier Patient Status:  Inpatient    1934 MRN [id number]   Location [facility] 3W/SW Attending Armando Reid MD   Hosp Day # 1 PCP MD [place] Worthington Lab  06/16/17   1256  06/17/17   0559   WBC  9.5  6.9   HGB  11.7*  11.4*   PLT  287  283       Chem:  Recent Labs   Lab  06/16/17   1256  06/17/17   0559   NA  139  139   K  4.2  3.5   CL  101  102   CO2  26  26   BUN  29*  23*   CREATSERUM  1.13  1.18

## 2017-06-18 NOTE — ED INITIAL ASSESSMENT (HPI)
Patient received via EMS with c/o episode of bilateral neck burning sensation last night. Discharged from this hospital yesterday s/p MI. Denies any current symptoms.

## 2017-06-18 NOTE — ED NOTES
Patient received discharge instructions with follow-up instructions and verbalized understanding. Patient ambulated out of ER in stable condition in no apparent distress.

## 2017-06-18 NOTE — ED PROVIDER NOTES
Patient Seen in: [facility] AND [facility] Emergency Department    History   Patient presents with:  Chest Pain Angina (cardiovascular)      HPI    The patient presents to the ED after experiencing burning in her upper chest and bilateral face last night and aga Marital Status:              Spouse Name:                       Years of Education:                 Number of children:               Social History Main Topics    Smoking Status: Former Smoker                   Packs/Day: 1.00  Years: 61        Typ breath sounds normal. No respiratory distress. Abdominal: Soft. She exhibits no distension. There is no tenderness. Musculoskeletal: She exhibits no tenderness. Neurological: She is alert and oriented to person, place, and time.    Skin: Skin is warm

## 2017-06-22 PROBLEM — I50.22 CHRONIC SYSTOLIC HEART FAILURE (HCC): Status: ACTIVE | Noted: 2017-01-01

## 2017-06-22 PROBLEM — R07.9 ACUTE CHEST PAIN: Status: RESOLVED | Noted: 2017-01-01 | Resolved: 2017-01-01

## 2017-06-27 NOTE — PROCEDURES
Pulmonary Function Test Results     Impression: Mild obstructive ventilatory defect (FEV1 1.18L, 75% of predicted). There is evidence of significant air trapping and hyperinflation (% of predicted, % of predicted).  The DLCO is technically garg

## 2017-07-05 PROBLEM — J96.11 CHRONIC RESPIRATORY FAILURE WITH HYPOXIA (HCC): Status: ACTIVE | Noted: 2017-01-01

## 2017-07-19 PROBLEM — R53.1 WEAKNESS GENERALIZED: Status: ACTIVE | Noted: 2017-01-01

## 2017-07-20 PROBLEM — R53.1 WEAKNESS GENERALIZED: Status: RESOLVED | Noted: 2017-01-01 | Resolved: 2017-01-01

## 2017-07-20 PROBLEM — R77.8 ELEVATED TROPONIN: Status: RESOLVED | Noted: 2017-01-01 | Resolved: 2017-01-01

## 2017-07-20 PROBLEM — J44.9 COPD (CHRONIC OBSTRUCTIVE PULMONARY DISEASE) (HCC): Status: RESOLVED | Noted: 2017-01-01 | Resolved: 2017-01-01

## 2017-07-20 PROBLEM — B02.9 HERPES ZOSTER: Status: ACTIVE | Noted: 2017-01-01

## 2017-07-20 NOTE — PLAN OF CARE
CARDIOVASCULAR - ADULT    • Maintains optimal cardiac output and hemodynamic stability Completed    • Absence of cardiac arrhythmias or at baseline Completed        METABOLIC/FLUID AND ELECTROLYTES - ADULT    • Electrolytes maintained within normal limits

## 2017-07-20 NOTE — PLAN OF CARE
CARDIOVASCULAR - ADULT    • Maintains optimal cardiac output and hemodynamic stability Progressing    • Absence of cardiac arrhythmias or at baseline Progressing        METABOLIC/FLUID AND ELECTROLYTES - ADULT    • Electrolytes maintained within normal nieves

## 2017-07-20 NOTE — ED PROVIDER NOTES
Patient Seen in: [facility] AND [facility] Emergency Department    History   Patient presents with:  Weakness  Fall    Stated Complaint:     HPI    Patient is an 22-year-old female from [address] [place] [address] assisted living who arrives by EMS with her  for generalized surgical history. Medications :   Fluticasone Propionate 50 MCG/ACT Nasal Suspension,  1 spray by Each Nare route daily. furosemide 40 MG Oral Tab,  Take 20 mg by mouth daily.      Isosorbide Mononitrate ER 30 MG Oral Tablet 24 Hr,  Take 1 tablet (30 m Onset   • Heart Disorder Mother    • Cancer Paternal Grandmother      breast cancer   • Heart Disorder Sister    • Diabetes Sister    • Hypertension Sister    • Cancer Brother      breast cancer   • Cancer Brother      bladder cancer       Smoking status: following:     Beta Natriuretic Peptide 1,128 (*)     All other components within normal limits   PTT, ACTIVATED - Abnormal; Notable for the following:     PTT 36.5 (*)     All other components within normal limits   CBC W/ DIFFERENTIAL - Abnormal; Notable follow up    Critical Care:   I spent a total of 75 minutes of critical care time in obtaining history, performing a physical exam, bedside monitoring of interventions, collecting and interpreting tests and discussion with consultants but not including time

## 2017-07-20 NOTE — CONSULTS
Reason for Consultation: CAD and aortic stenosis    Assessment/Plan:   1. Chest pain  - not ACS  - nuclear 2/15: EF 30%; fixed defects  - likely has significant CAD  - wants to be treated conservatively  2.  Aortic stenosis  - moderate by Echo 1/16: Mean gr Onset   • Heart Disorder Mother    • Cancer Paternal Grandmother      breast cancer   • Heart Disorder Sister    • Diabetes Sister    • Hypertension Sister    • Cancer Brother      breast cancer   • Cancer Brother      bladder cancer      Past Surgical His past 24 hrs:   BP Temp Temp src Pulse Resp SpO2 Height Weight   07/20/17 0900 115/61 - - - 20 - - -   07/20/17 0455 99/51 98.4 °F (36.9 °C) Oral 93 20 94 % - -   07/19/17 2233 130/62 98.5 °F (36.9 °C) Oral 96 20 100 % 5' (1.524 m) 107 lb 11.2 oz (48.9 kg) PLT  291         Imaging:  Xr Chest Ap Portable  (cpt=71010)    Result Date: 7/19/2017  CONCLUSION:   Emphysema with spiculated nodular opacity in the right upper lobe corresponding with recent CT findings.  No other airspace disease evident on a single v

## 2017-07-20 NOTE — DISCHARGE PLANNING
Patient A/O. Denies any pain or SOB. Patient to be discharged to [facility]. Discharge instructions included medications, prescriptions, follow up appointment, and safety at home. Reviewed with patient. Patient verbalized understanding and compliance.  Maria L Cevallos

## 2017-07-20 NOTE — ED INITIAL ASSESSMENT (HPI)
Pt took Lyrica for the first time at 2200 last night and reports sleeping until 1100 today which pt reports is very unusual for her to sleep this long. Pt c/o weakness and falling twice at home. Pt c/o swelling to her feet and mild SOB.  Pt on home o2 all t

## 2017-07-20 NOTE — H&P
[address] Patient Status:  Inpatient    1934 MRN [id number]   Location [facility] 3W/SW Attending Meg Martinez MD   Hosp Day # 1 PCP Gretta Agarwal MD     Date:  2017 • Hypertension Sister    • Cancer Brother      breast cancer   • Cancer Brother      bladder cancer     Social History:  Smoking status: Former Smoker                                                              Packs/day: 1.00      Years: 60.00        T (65 FE) MG Oral Tab EC Take 325 mg by mouth daily with breakfast.   Potassium Chloride ER 20 MEQ Oral Tab CR Take 1 tablet by mouth daily. PEG 3350 (MIRALAX) Oral Powd Pack Take 17 g by mouth daily.    Tiotropium Bromide Monohydrate (Amena Damico) 1 Well-appearing, well nourished, well developed, no apparent distress   SKIN: +vesiculo macular erythematous lesions on the right side of the chest wrapping around the neck.    HEENT: Atraumatic, normocephalic, throat is clear, LAURA, EOMI, clear conjunctiva R-wave progression -nonspecific -consider old anterior infarct.  -Nonspecific ST depression -Nondiagnostic.  ABNORMAL When compared with ECG of 06/18/2017 12:41:03 No significant changes have occurred Electronically signed on 07/20/2017 at 07:27 by [facility]

## 2017-07-20 NOTE — DISCHARGE PLANNING
7/20CM-The Patient's RN informed this Writer that will need an ambulance transfer home. The Patient is on 3 liters of oxygen. This Writer made arrangements for an ambulance  to transport the Patient to her home at [facility] today (7/20) at 3:00 p.m.  This W

## 2017-07-20 NOTE — PROGRESS NOTES
[facility] Pharmacy Note:  Renal Adjustment for Acyclovir     Aminah Hernandez is a 80year old female who has been prescribed  Acyclovir   800 mg 5 times a day. CrCl is estimated creatinine clearance is 26.2 mL/min (based on SCr of 1.19 mg/dL).  so the dose has

## 2017-07-29 PROBLEM — I95.9 HYPOTENSION: Status: ACTIVE | Noted: 2017-01-01

## 2017-07-29 PROBLEM — N12 PYELONEPHRITIS: Status: ACTIVE | Noted: 2017-01-01

## 2017-07-29 PROBLEM — N30.01 ACUTE CYSTITIS WITH HEMATURIA: Status: ACTIVE | Noted: 2017-01-01

## 2017-07-29 PROBLEM — A41.9 SEPTIC SHOCK (HCC): Status: ACTIVE | Noted: 2017-01-01

## 2017-07-29 PROBLEM — N17.9 AKI (ACUTE KIDNEY INJURY) (HCC): Status: ACTIVE | Noted: 2017-01-01

## 2017-07-29 PROBLEM — R94.31 ST ELEVATION: Status: ACTIVE | Noted: 2017-01-01

## 2017-07-29 PROBLEM — I21.4 NSTEMI (NON-ST ELEVATED MYOCARDIAL INFARCTION) (HCC): Status: ACTIVE | Noted: 2017-01-01

## 2017-07-29 PROBLEM — D72.829 LEUKOCYTOSIS: Status: ACTIVE | Noted: 2017-01-01

## 2017-07-29 PROBLEM — R65.21 SEPTIC SHOCK (HCC): Status: ACTIVE | Noted: 2017-01-01

## 2017-07-29 NOTE — ED INITIAL ASSESSMENT (HPI)
Pt to ed from [facility] with c/o fever hx of shingles 2 weeks ago and ams today  states pt has been altered and has a fever

## 2017-07-29 NOTE — ED PROVIDER NOTES
Patient Seen in: [facility] AND [facility] Emergency Department    History   Patient presents with:  Altered Mental Status (neurologic)    Stated Complaint: AMS fever     HPI    80-year-old female with history of CAD, COPD, aortic aortic valve stenosis, hyperten Tab,  Take 10 mg by mouth daily. Vitamin D3 2000 units Oral Cap,  Take 2,000 Units by mouth daily. Pravastatin Sodium 40 MG Oral Tab,  Take 40 mg by mouth nightly.    albuterol sulfate (2.5 MG/3ML) 0.083% Inhalation Nebu Soln,  Take by nebulization ever Paternal Grandmother      breast cancer   • Heart Disorder Sister    • Diabetes Sister    • Hypertension Sister    • Cancer Brother      breast cancer   • Cancer Brother      bladder cancer       Smoking status: Former Smoker strength in b/l UEs and LEs, normal sensation in all extremities, normal speech     Skin: Skin is dry. No rash noted. She is not diaphoretic. Not to touch   Psychiatric: She has a normal mood and affect. Nursing note and vitals reviewed.       ED Course Auto Resulted    -BNP (B TYPE NATRIUERTIC PEPTIDE)   Collection Time: 07/29/17 10:13 AM   Result Value Ref Range   Beta Natriuretic Peptide 309 (H) 0 - 100 pg/mL   -TROPONIN I, 0 HOUR   Collection Time: 07/29/17 10:13 AM   Result Value Ref Range   Troponin BLOOD TYPE Positive    -ANTIBODY SCREEN   Collection Time: 07/29/17 10:13 AM   Result Value Ref Range   Antibody Screen Negative    -LIPID PANEL   Collection Time: 07/29/17 10:13 AM   Result Value Ref Range   HDL Cholesterol 100 mg/dL   Cholesterol, Total and fluid bolus  - ASA and tylenol ordered  - Dr. Pk Duncan at pt's bedside - pt has a hx of baseline ST elevation and depression, likely accentuated on today's EKG 2/2 tachycardia - recommending lopressor and fluids, and repeat EKG and cancel cardiac aler Pyelonephritis N12 7/29/2017 Unknown    ST elevation R94.31 7/29/2017 Unknown

## 2017-07-29 NOTE — CONSULTS
[facility] Cardiology Consultation    Melisa Contreras Patient Status:  Emergency    1934 MRN [id number]   Location [address] Attending Solo Bailey MD   Hosp Day # 0 PCP Dominic Pimentel MD     Reason for Consultation:  Ab Paternal Grandmother      breast cancer   • Heart Disorder Sister    • Diabetes Sister    • Hypertension Sister    • Cancer Brother      breast cancer   • Cancer Brother      bladder cancer         Allergies:    Ibuprofen                 Lyrica [Pregabalin last 72 hours. No results for input(s): TROP, CK in the last 72 hours. Impression:   1. Abnormal EKG/NSTEMI - She likely has severe CAD, but I do not feel her presenting complaints are c/w an acute coronary syndrome.   Baseline EKG changes accentuat

## 2017-07-29 NOTE — H&P
[address] Patient Status:  Emergency    1934 MRN [id number]   Location [address] Attending Carmella Garcia MD   Hosp Day # 0 PCP Robyn Sarmiento MD     Date: breast cancer   • Heart Disorder Sister    • Diabetes Sister    • Hypertension Sister    • Cancer Brother      breast cancer   • Cancer Brother      bladder cancer     Social History:  Smoking status: Former Smoker intact; no sensory deficits       Cervical Papanicolaou contraindicated    Results:       Lab Results  Component Value Date   WBC 20.3 (H) 07/29/2017   HGB 12.4 07/29/2017   HCT 36.2 07/29/2017    07/29/2017   CREATSERUM 1.68 (H) 07/29/2017   BUN 28

## 2017-07-30 NOTE — DISCHARGE SUMMARY
[facility]    Discharge Summary    Sarasota Larv Patient Status:  Inpatient    1934 MRN [id number]   Location [facility] 2W/SW Attending No att. providers found   Hosp Day # 1 PCP Cheri Lopez MD     Date of Admission of care. Pt's mental status initially improved following IV ceftriaxone and IVF resuscitation. Her SBP improved to > 90 and her levophed was titrated appropriately.  RN noted in the afternoon that pt appeared anxious and was complaining of chest tightnes

## 2017-07-30 NOTE — PLAN OF CARE
[phone]  [facility] notified, spoke with Danilo Wright. Patient not a candidate for donation. Reference #[id number].   Also notified. Spoke with margarita. At 2020 Body is released. Dr. Olaf Lim notified and Dr. Kami Tierney was also paged to notify about death.

## 2017-07-30 NOTE — PROGRESS NOTES
Called the patient's room to evaluate for signs of death. Patient admitted earlier today with altered mental status, septic shock and ACS. DNR status. Found patient unresponsive with no spontaneous pulses or spontaneous respirations in the room.   Asysto

## 2017-07-30 NOTE — PLAN OF CARE
Death Note:  Per Dr. Julio C Irvin note and Kin Hernandez. Bedside report at change of shift,  patient was pronounced at 1920 and found to have no respirations, no heart tones or pulses.

## 2017-07-30 NOTE — PROGRESS NOTES
At approximately 1815 patient reported having a feeling of nausea and stomach discomfort. Dr Jace Bee was notified at this time and ordered zofran.  Patient was administered the zofran and reported some relief of stomach discomfort but reported an increase in

## (undated) NOTE — ED AVS SNAPSHOT
[facility] Emergency Department    [address]     [address] [place] [address]    Phone:  [phone]    Fax:  [place]   MRN: [id number]    Department:  [facility] Emergency Department   Date of Visit:  6/ and Class Registration line at [phone] or find a doctor online by visiting [email or web address].    IF THERE IS ANY CHANGE OR WORSENING OF YOUR CONDITION, CALL YOUR PRIMARY CARE PHYSICIAN AT ONCE OR RETURN IMMEDIATELY TO [address] [place] [address].     If

## (undated) NOTE — ED AVS SNAPSHOT
[facility] Emergency Department    [address]     [address] [place] [address]    Phone:  [phone]    Fax:  [place]   MRN: [id number]    Department:  [facility] Emergency Department   Date of Visit:  6/ Si tiene problemas para programar emmanuel jairon de seguimiento según lo indicado, llame al encargado de brenda al [phone]. It is our goal to assure that you are completely satisfied with every aspect of your visit today.   In an effort to constantly impr list to your next doctor's appointment. Any imaging studies and labs completed today can be reviewed in your MyChart account. You may have had testing done that requires us to contact you. Please make sure we have your correct phone number on file. Visit [facility]  You can access your MyChart to more actively manage your health care and view more details from this visit by going to [email or web address].   If you've recently had a stay at the Hospital you can access your discharge instructions i

## (undated) NOTE — IP AVS SNAPSHOT
[address]  [address] [facility], [place] ~ [phone]                Discharge Summary   6/16/2017    Delisa Reasons           Admission Information        Provider Department    6/16/2017 Chetan Hassan MD [facility] Generic drug:  Fexofenadine HCl   Next dose due:  6/18/17 in am        Take 180 mg by mouth daily. aspirin 81 MG Tabs   Next dose due:  6/18/17 in am        Take 81 mg by mouth daily.                             CALCIUM + D OR   N Commonly known as:  Alyssa Yao   Next dose due:  6/18/17 in am        Inhale into the lungs daily.                             Verapamil HCl 120 MG Tabs   Last time this was given:  120 mg on 6/17/2017 11:37 AM   Commonly known as:  Gela Pneumococcal (Prevnar 7) 2/12/2008      Recent Hematology Lab Results  (Last 3 results in the past 90 days)    WBC RBC Hemoglobin Hematocrit MCV MCH MCHC RDW Platelet MPV    (50/84/66)  6.9 (06/17/17)  3.66 (L) (06/17/17)  11.4 (L) (06/17/17)  34.1 (L) (0 We are concerned for your overall well being:    - If you are a smoker or have smoked in the last 12 months, we encourage you to explore options for quitting.     - If you have concerns related to behavioral health issues or thoughts of harming yourself, call your provider or healthcare team if you have any questions regarding your medications while at home.          Blood Pressure and Cardiac Medications     Isosorbide Mononitrate ER 30 MG Oral Tablet 24 Hr    Verapamil HCl (CALAN) 120 MG Oral Tab Most common side effects:  Depends on the specific medication but generally include: diarrhea, constipation, headache, allergic reaction (itching, rash, hives)   What to report to your healthcare team: Pain, nausea/vomiting, no bowel movement in 2+ days, d